# Patient Record
Sex: FEMALE | URBAN - METROPOLITAN AREA
[De-identification: names, ages, dates, MRNs, and addresses within clinical notes are randomized per-mention and may not be internally consistent; named-entity substitution may affect disease eponyms.]

---

## 2021-02-26 ENCOUNTER — APPOINTMENT (RX ONLY)
Dept: URBAN - METROPOLITAN AREA CLINIC 80 | Facility: CLINIC | Age: 26
Setting detail: DERMATOLOGY
End: 2021-02-26

## 2021-02-26 DIAGNOSIS — L70.0 ACNE VULGARIS: ICD-10-CM

## 2021-02-26 PROCEDURE — ? PRODUCT LINE (OFFICE PRODUCTS)

## 2021-02-26 PROCEDURE — ? PRESCRIPTION MEDICATION MANAGEMENT

## 2021-02-26 PROCEDURE — ? PRESCRIPTION

## 2021-02-26 PROCEDURE — ? COUNSELING

## 2021-02-26 PROCEDURE — ? MEDICATION COUNSELING

## 2021-02-26 PROCEDURE — 99203 OFFICE O/P NEW LOW 30 MIN: CPT

## 2021-02-26 RX ORDER — DOXYCYCLINE HYCLATE 100 MG/1
CAPSULE, GELATIN COATED ORAL
Qty: 30 | Refills: 2 | Status: ERX | COMMUNITY
Start: 2021-02-26

## 2021-02-26 RX ADMIN — DOXYCYCLINE HYCLATE: 100 CAPSULE, GELATIN COATED ORAL at 00:00

## 2021-02-26 NOTE — PROCEDURE: MEDICATION COUNSELING
Msg to MD: Secured Annual Mary Shanice for Tuesday, 10/27 @ 2:20 PM MD.    Phone call to patient.  Informed her she is due for Annual Mary Shanice Treatment.  She is aware she will owe $500 upfront the day of for the yearly Mary Shanice.  She verbalizes understanding.  We discussed securing date of Tuesday, 10/27 @ 2:20 PM MD.  Reminded her to shave 3 days prior and to avoid lotions/creams.  No further questions from patient.  Consent day of.  [employer], [employer] and [employer] Scheduling updated.    Letter created, printed and mailed to patient.   Rituxan Pregnancy And Lactation Text: This medication is Pregnancy Category C and it isn't know if it is safe during pregnancy. It is unknown if this medication is excreted in breast milk but similar antibodies are known to be excreted.

## 2021-02-26 NOTE — PROCEDURE: PRESCRIPTION MEDICATION MANAGEMENT
Plan: Apply to Differin gel QHS
Initiate Treatment: Doxycycline 100 mg QD
Detail Level: Zone
Render In Strict Bullet Format?: No

## 2021-02-26 NOTE — PROCEDURE: PRODUCT LINE (OFFICE PRODUCTS)
Product 35 Price (In Dollars - Numeric Only, No Special Characters Or $): 0.00
Product 13 Application Directions: Use twice daily as directed
Product 18 Price (In Dollars - Numeric Only, No Special Characters Or $): 33.00
Product 39 Units: 0
Name Of Product 9: R Essentials Lite moisture cream
Name Of Product 18: R Essentials Antioxidant Infused Gentle Foamy Cleanser
Product 4 Price (In Dollars - Numeric Only, No Special Characters Or $): 19.00
Product 8 Application Directions: Apply as needed for dry skin
Product 13 Price (In Dollars - Numeric Only, No Special Characters Or $): 28.00
Name Of Product 4: R Essentials 10% BP Cleanser
Allow Plan To Count Towards E/M Coding: Yes
Product 17 Application Directions: Apply twice daily to dark spots
Name Of Product 13: Avene Antirougeurs cleansing milk (redness relief)
Product 8 Price (In Dollars - Numeric Only, No Special Characters Or $): 28.50
Product 12 Application Directions: Apply daily to face as directed
Risk Of Complication Category: Minimal
Product 17 Price (In Dollars - Numeric Only, No Special Characters Or $): 70.00
Product 12 Units: 1
Assigning Risk Information: Per AMA, level of risk is based upon consequences of the problem(s) addressed at the encounter when appropriately treated. Risk also includes medical decision making related to the need to initiate or forego further testing, treatment and/or hospitalization. Over the counter medication are assigned a risk level of low. Prescription medication management is assigned a risk level of moderate.
Name Of Product 8: R Essentials Ultra-moisture cream
Name Of Product 17: Glytone Brightening Complex
Product 3 Price (In Dollars - Numeric Only, No Special Characters Or $): 20.00
Product 12 Price (In Dollars - Numeric Only, No Special Characters Or $): 42.50
Product 16 Application Directions: Apply at bedtime as directed
Name Of Product 3: R Essentials 5% BP Cleanser
Detail Level: Zone
Name Of Product 12: R Essentials 20% AHA cream
Product 2 Application Directions: Apply once daily as directed
Product 11 Application Directions: Apply to face daily as directed
Product 16 Price (In Dollars - Numeric Only, No Special Characters Or $): 69.00
Name Of Product 7: R Essentials 10% AHA gentle foaming cleanser
Name Of Product 16: Avene Retrinal 0.1
Product 2 Price (In Dollars - Numeric Only, No Special Characters Or $): 21.00
Product 6 Application Directions: Use every morning as directed
Product 11 Price (In Dollars - Numeric Only, No Special Characters Or $): 36.00
Name Of Product 2: R Essentials 10% BP Gel
Product 15 Application Directions: Apply twice daily as directed
Name Of Product 11: R Essentials 15% AHA cream
Product 1 Application Directions: Use two times daily as directed
Product 6 Price (In Dollars - Numeric Only, No Special Characters Or $): 42.00
Product 10 Application Directions: Apply daily to the face as directed
Product 15 Price (In Dollars - Numeric Only, No Special Characters Or $): 48.00
Name Of Product 6: R Essentials Ultra-antioxidant sunscreen 50+
Name Of Product 15: Avene Antirougeurs Fort (relief concentrate)
Product 1 Price (In Dollars - Numeric Only, No Special Characters Or $): 27.00
Product 10 Price (In Dollars - Numeric Only, No Special Characters Or $): 31.00
Product 5 Application Directions: Use twice weekly as directed
Product 14 Application Directions: Apply every morning as directed
Name Of Product 1: R Essentials 10/2 Cleanser
Product 19 Price (In Dollars - Numeric Only, No Special Characters Or $): 16.00
Name Of Product 10: R Essentials 10% AHA cream
Name Of Product 19: R Essentials 5% BP Gel
Product 18 Application Directions: Use daily as directed
Render Product Pricing In Note: No
Name Of Product 5: R Essentials 10% AHA scalp/body shampoo
Name Of Product 14: Avene Antirougeurs Day (redness relief soothing cream with SPF)
Product 9 Price (In Dollars - Numeric Only, No Special Characters Or $): 34.00

## 2021-02-26 NOTE — PROCEDURE: COUNSELING
Sarecycline Pregnancy And Lactation Text: This medication is Pregnancy Category D and not consider safe during pregnancy. It is also excreted in breast milk.
Erythromycin Counseling:  I discussed with the patient the risks of erythromycin including but not limited to GI upset, allergic reaction, drug rash, diarrhea, increase in liver enzymes, and yeast infections.
Tazorac Counseling:  Patient advised that medication is irritating and drying.  Patient may need to apply sparingly and wash off after an hour before eventually leaving it on overnight.  The patient verbalized understanding of the proper use and possible adverse effects of tazorac.  All of the patient's questions and concerns were addressed.
Topical Sulfur Applications Counseling: Topical Sulfur Counseling: Patient counseled that this medication may cause skin irritation or allergic reactions.  In the event of skin irritation, the patient was advised to reduce the amount of the drug applied or use it less frequently.   The patient verbalized understanding of the proper use and possible adverse effects of topical sulfur application.  All of the patient's questions and concerns were addressed.
Birth Control Pills Pregnancy And Lactation Text: This medication should be avoided if pregnant and for the first 30 days post-partum.
Doxycycline Pregnancy And Lactation Text: This medication is Pregnancy Category D and not consider safe during pregnancy. It is also excreted in breast milk but is considered safe for shorter treatment courses.
High Dose Vitamin A Counseling: Side effects reviewed, pt to contact office should one occur.
Azithromycin Counseling:  I discussed with the patient the risks of azithromycin including but not limited to GI upset, allergic reaction, drug rash, diarrhea, and yeast infections.
Sarecycline Counseling: Patient advised regarding possible photosensitivity and discoloration of the teeth, skin, lips, tongue and gums.  Patient instructed to avoid sunlight, if possible.  When exposed to sunlight, patients should wear protective clothing, sunglasses, and sunscreen.  The patient was instructed to call the office immediately if the following severe adverse effects occur:  hearing changes, easy bruising/bleeding, severe headache, or vision changes.  The patient verbalized understanding of the proper use and possible adverse effects of sarecycline.  All of the patient's questions and concerns were addressed.
Topical Retinoid Pregnancy And Lactation Text: This medication is Pregnancy Category C. It is unknown if this medication is excreted in breast milk.
Birth Control Pills Counseling: Birth Control Pill Counseling: I discussed with the patient the potential side effects of OCPs including but not limited to increased risk of stroke, heart attack, thrombophlebitis, deep venous thrombosis, hepatic adenomas, breast changes, GI upset, headaches, and depression.  The patient verbalized understanding of the proper use and possible adverse effects of OCPs. All of the patient's questions and concerns were addressed.
Detail Level: Zone
Isotretinoin Pregnancy And Lactation Text: This medication is Pregnancy Category X and is considered extremely dangerous during pregnancy. It is unknown if it is excreted in breast milk.
Topical Clindamycin Pregnancy And Lactation Text: This medication is Pregnancy Category B and is considered safe during pregnancy. It is unknown if it is excreted in breast milk.
Doxycycline Counseling:  Patient counseled regarding possible photosensitivity and increased risk for sunburn.  Patient instructed to avoid sunlight, if possible.  When exposed to sunlight, patients should wear protective clothing, sunglasses, and sunscreen.  The patient was instructed to call the office immediately if the following severe adverse effects occur:  hearing changes, easy bruising/bleeding, severe headache, or vision changes.  The patient verbalized understanding of the proper use and possible adverse effects of doxycycline.  All of the patient's questions and concerns were addressed.
Use Enhanced Medication Counseling?: No
Tetracycline Counseling: Patient counseled regarding possible photosensitivity and increased risk for sunburn.  Patient instructed to avoid sunlight, if possible.  When exposed to sunlight, patients should wear protective clothing, sunglasses, and sunscreen.  The patient was instructed to call the office immediately if the following severe adverse effects occur:  hearing changes, easy bruising/bleeding, severe headache, or vision changes.  The patient verbalized understanding of the proper use and possible adverse effects of tetracycline.  All of the patient's questions and concerns were addressed. Patient understands to avoid pregnancy while on therapy due to potential birth defects.
Topical Retinoid counseling:  Patient advised to apply a pea-sized amount only at bedtime and wait 30 minutes after washing their face before applying.  If too drying, patient may add a non-comedogenic moisturizer. The patient verbalized understanding of the proper use and possible adverse effects of retinoids.  All of the patient's questions and concerns were addressed.
Isotretinoin Counseling: Patient should get monthly blood tests, not donate blood, not drive at night if vision affected, not share medication, and not undergo elective surgery for 6 months after tx completed. Side effects reviewed, pt to contact office should one occur.
Bactrim Pregnancy And Lactation Text: This medication is Pregnancy Category D and is known to cause fetal risk.  It is also excreted in breast milk.
Spironolactone Pregnancy And Lactation Text: This medication can cause feminization of the male fetus and should be avoided during pregnancy. The active metabolite is also found in breast milk.
Topical Clindamycin Counseling: Patient counseled that this medication may cause skin irritation or allergic reactions.  In the event of skin irritation, the patient was advised to reduce the amount of the drug applied or use it less frequently.   The patient verbalized understanding of the proper use and possible adverse effects of clindamycin.  All of the patient's questions and concerns were addressed.
Minocycline Counseling: Patient advised regarding possible photosensitivity and discoloration of the teeth, skin, lips, tongue and gums.  Patient instructed to avoid sunlight, if possible.  When exposed to sunlight, patients should wear protective clothing, sunglasses, and sunscreen.  The patient was instructed to call the office immediately if the following severe adverse effects occur:  hearing changes, easy bruising/bleeding, severe headache, or vision changes.  The patient verbalized understanding of the proper use and possible adverse effects of minocycline.  All of the patient's questions and concerns were addressed.
Dapsone Pregnancy And Lactation Text: This medication is Pregnancy Category C and is not considered safe during pregnancy or breast feeding.
Benzoyl Peroxide Pregnancy And Lactation Text: This medication is Pregnancy Category C. It is unknown if benzoyl peroxide is excreted in breast milk.
Bactrim Counseling:  I discussed with the patient the risks of sulfa antibiotics including but not limited to GI upset, allergic reaction, drug rash, diarrhea, dizziness, photosensitivity, and yeast infections.  Rarely, more serious reactions can occur including but not limited to aplastic anemia, agranulocytosis, methemoglobinemia, blood dyscrasias, liver or kidney failure, lung infiltrates or desquamative/blistering drug rashes.
Erythromycin Pregnancy And Lactation Text: This medication is Pregnancy Category B and is considered safe during pregnancy. It is also excreted in breast milk.
Spironolactone Counseling: Patient advised regarding risks of diarrhea, abdominal pain, hyperkalemia, birth defects (for female patients), liver toxicity and renal toxicity. The patient may need blood work to monitor liver and kidney function and potassium levels while on therapy. The patient verbalized understanding of the proper use and possible adverse effects of spironolactone.  All of the patient's questions and concerns were addressed.
Tazorac Pregnancy And Lactation Text: This medication is not safe during pregnancy. It is unknown if this medication is excreted in breast milk.
High Dose Vitamin A Pregnancy And Lactation Text: High dose vitamin A therapy is contraindicated during pregnancy and breast feeding.
Benzoyl Peroxide Counseling: Patient counseled that medicine may cause skin irritation and bleach clothing.  In the event of skin irritation, the patient was advised to reduce the amount of the drug applied or use it less frequently.   The patient verbalized understanding of the proper use and possible adverse effects of benzoyl peroxide.  All of the patient's questions and concerns were addressed.
Topical Sulfur Applications Pregnancy And Lactation Text: This medication is Pregnancy Category C and has an unknown safety profile during pregnancy. It is unknown if this topical medication is excreted in breast milk.
Azithromycin Pregnancy And Lactation Text: This medication is considered safe during pregnancy and is also secreted in breast milk.
Dapsone Counseling: I discussed with the patient the risks of dapsone including but not limited to hemolytic anemia, agranulocytosis, rashes, methemoglobinemia, kidney failure, peripheral neuropathy, headaches, GI upset, and liver toxicity.  Patients who start dapsone require monitoring including baseline LFTs and weekly CBCs for the first month, then every month thereafter.  The patient verbalized understanding of the proper use and possible adverse effects of dapsone.  All of the patient's questions and concerns were addressed.

## 2021-03-26 ENCOUNTER — APPOINTMENT (RX ONLY)
Dept: URBAN - METROPOLITAN AREA CLINIC 80 | Facility: CLINIC | Age: 26
Setting detail: DERMATOLOGY
End: 2021-03-26

## 2021-03-26 DIAGNOSIS — L70.0 ACNE VULGARIS: ICD-10-CM

## 2021-03-26 PROCEDURE — ? PRESCRIPTION

## 2021-03-26 PROCEDURE — ? PRESCRIPTION MEDICATION MANAGEMENT

## 2021-03-26 PROCEDURE — ? COUNSELING

## 2021-03-26 PROCEDURE — ? MEDICATION COUNSELING

## 2021-03-26 PROCEDURE — 99213 OFFICE O/P EST LOW 20 MIN: CPT

## 2021-03-26 PROCEDURE — ? PRODUCT LINE (OFFICE PRODUCTS)

## 2021-03-26 RX ORDER — DOXYCYCLINE HYCLATE 100 MG/1
CAPSULE, GELATIN COATED ORAL
Qty: 60 | Refills: 2 | Status: ERX

## 2021-03-26 ASSESSMENT — LOCATION ZONE DERM: LOCATION ZONE: FACE

## 2021-03-26 ASSESSMENT — LOCATION SIMPLE DESCRIPTION DERM
LOCATION SIMPLE: LEFT CHEEK
LOCATION SIMPLE: RIGHT CHEEK

## 2021-03-26 ASSESSMENT — LOCATION DETAILED DESCRIPTION DERM
LOCATION DETAILED: LEFT INFERIOR CENTRAL MALAR CHEEK
LOCATION DETAILED: RIGHT INFERIOR CENTRAL MALAR CHEEK

## 2021-03-26 NOTE — PROCEDURE: PRESCRIPTION MEDICATION MANAGEMENT
Plan: Apply to Differin gel QHS
Initiate Treatment: Doxycycline 100 mg BID
Detail Level: Zone
Render In Strict Bullet Format?: No
Continue Regimen: AHA 20% QAM

## 2021-03-26 NOTE — PROCEDURE: PRODUCT LINE (OFFICE PRODUCTS)
Product 35 Price (In Dollars - Numeric Only, No Special Characters Or $): 0.00
Product 13 Application Directions: Use twice daily as directed
Product 18 Price (In Dollars - Numeric Only, No Special Characters Or $): 40.47
Product 39 Units: 0
Name Of Product 9: R Essentials Lite moisture cream
Name Of Product 18: R Essentials Antioxidant Infused Gentle Foamy Cleanser
Product 4 Price (In Dollars - Numeric Only, No Special Characters Or $): 19.00
Product 8 Application Directions: Apply as needed for dry skin
Product 13 Price (In Dollars - Numeric Only, No Special Characters Or $): 28.00
Name Of Product 4: R Essentials 10% BP Cleanser
Allow Plan To Count Towards E/M Coding: Yes
Product 17 Application Directions: Apply twice daily to dark spots
Name Of Product 13: Avene Antirougeurs cleansing milk (redness relief)
Product 8 Price (In Dollars - Numeric Only, No Special Characters Or $): 28.50
Product 12 Application Directions: Apply daily to face as directed
Risk Of Complication Category: Minimal
Product 17 Price (In Dollars - Numeric Only, No Special Characters Or $): 70.00
Assigning Risk Information: Per AMA, level of risk is based upon consequences of the problem(s) addressed at the encounter when appropriately treated. Risk also includes medical decision making related to the need to initiate or forego further testing, treatment and/or hospitalization. Over the counter medication are assigned a risk level of low. Prescription medication management is assigned a risk level of moderate.
Name Of Product 8: R Essentials Ultra-moisture cream
Name Of Product 17: Glytone Brightening Complex
Product 3 Price (In Dollars - Numeric Only, No Special Characters Or $): 20.00
Product 12 Price (In Dollars - Numeric Only, No Special Characters Or $): 42.50
Product 16 Application Directions: Apply at bedtime as directed
Name Of Product 3: R Essentials 5% BP Cleanser
Detail Level: Zone
Name Of Product 12: R Essentials 20% AHA cream
Product 2 Application Directions: Apply once daily as directed
Product 11 Application Directions: Apply to face daily as directed
Product 16 Price (In Dollars - Numeric Only, No Special Characters Or $): 69.00
Name Of Product 7: R Essentials 10% AHA gentle foaming cleanser
Name Of Product 16: Avene Retrinal 0.1
Product 2 Price (In Dollars - Numeric Only, No Special Characters Or $): 21.00
Product 6 Application Directions: Use every morning as directed
Product 11 Price (In Dollars - Numeric Only, No Special Characters Or $): 36.00
Name Of Product 2: R Essentials 10% BP Gel
Product 15 Application Directions: Apply twice daily as directed
Name Of Product 11: R Essentials 15% AHA cream
Product 1 Application Directions: Use two times daily as directed
Product 6 Price (In Dollars - Numeric Only, No Special Characters Or $): 42.00
Product 10 Application Directions: Apply daily to the face as directed
Product 15 Price (In Dollars - Numeric Only, No Special Characters Or $): 48.00
Name Of Product 6: R Essentials Ultra-antioxidant sunscreen 50+
Name Of Product 15: Avene Antirougeurs Fort (relief concentrate)
Product 1 Price (In Dollars - Numeric Only, No Special Characters Or $): 27.00
Product 10 Price (In Dollars - Numeric Only, No Special Characters Or $): 31.00
Product 5 Application Directions: Use twice weekly as directed
Product 14 Application Directions: Apply every morning as directed
Name Of Product 1: R Essentials 10/2 Cleanser
Product 19 Price (In Dollars - Numeric Only, No Special Characters Or $): 16.00
Name Of Product 10: R Essentials 10% AHA cream
Name Of Product 19: R Essentials 5% BP Gel
Product 18 Application Directions: Use daily as directed
Render Product Pricing In Note: No
Name Of Product 5: R Essentials 10% AHA scalp/body shampoo
Name Of Product 14: Avene Antirougeurs Day (redness relief soothing cream with SPF)
Product 18 Units: 1
Product 9 Price (In Dollars - Numeric Only, No Special Characters Or $): 34.00

## 2021-03-26 NOTE — PROCEDURE: MEDICATION COUNSELING
Moderate persistent asthma  Poorly controlled Cimetidine Counseling:  I discussed with the patient the risks of Cimetidine including but not limited to gynecomastia, headache, diarrhea, nausea, drowsiness, arrhythmias, pancreatitis, skin rashes, psychosis, bone marrow suppression and kidney toxicity.

## 2022-03-08 NOTE — PROCEDURE: MEDICATION COUNSELING
Yes Benzoyl Peroxide Counseling: Patient counseled that medicine may cause skin irritation and bleach clothing.  In the event of skin irritation, the patient was advised to reduce the amount of the drug applied or use it less frequently.   The patient verbalized understanding of the proper use and possible adverse effects of benzoyl peroxide.  All of the patient's questions and concerns were addressed.